# Patient Record
Sex: FEMALE | Race: WHITE
[De-identification: names, ages, dates, MRNs, and addresses within clinical notes are randomized per-mention and may not be internally consistent; named-entity substitution may affect disease eponyms.]

---

## 2018-08-10 ENCOUNTER — HOSPITAL ENCOUNTER (INPATIENT)
Dept: HOSPITAL 74 - JLDR | Age: 33
LOS: 2 days | Discharge: HOME | DRG: 560 | End: 2018-08-12
Attending: OBSTETRICS & GYNECOLOGY | Admitting: OBSTETRICS & GYNECOLOGY
Payer: COMMERCIAL

## 2018-08-10 VITALS — BODY MASS INDEX: 30.4 KG/M2

## 2018-08-10 DIAGNOSIS — Z3A.39: ICD-10-CM

## 2018-08-10 LAB
ANION GAP SERPL CALC-SCNC: 12 MMOL/L (ref 8–16)
APTT BLD: 25 SECONDS (ref 25.2–36.5)
BASOPHILS # BLD: 0.2 % (ref 0–2)
BUN SERPL-MCNC: 9 MG/DL (ref 7–18)
CALCIUM SERPL-MCNC: 8.6 MG/DL (ref 8.5–10.1)
CHLORIDE SERPL-SCNC: 113 MMOL/L (ref 98–107)
CO2 SERPL-SCNC: 18 MMOL/L (ref 21–32)
CREAT SERPL-MCNC: 0.5 MG/DL (ref 0.55–1.02)
DEPRECATED RDW RBC AUTO: 15.2 % (ref 11.6–15.6)
EOSINOPHIL # BLD: 0.3 % (ref 0–4.5)
GLUCOSE SERPL-MCNC: 110 MG/DL (ref 74–106)
HCT VFR BLD CALC: 34.1 % (ref 32.4–45.2)
HGB BLD-MCNC: 11.4 GM/DL (ref 10.7–15.3)
INR BLD: 1.03 (ref 0.83–1.09)
LYMPHOCYTES # BLD: 9.9 % (ref 8–40)
MCH RBC QN AUTO: 29.9 PG (ref 25.7–33.7)
MCHC RBC AUTO-ENTMCNC: 33.5 G/DL (ref 32–36)
MCV RBC: 89.1 FL (ref 80–96)
MONOCYTES # BLD AUTO: 5.8 % (ref 3.8–10.2)
NEUTROPHILS # BLD: 83.8 % (ref 42.8–82.8)
PLATELET # BLD AUTO: 240 K/MM3 (ref 134–434)
PMV BLD: 8.1 FL (ref 7.5–11.1)
POTASSIUM SERPLBLD-SCNC: 3.6 MMOL/L (ref 3.5–5.1)
PT PNL PPP: 11.6 SEC (ref 9.7–13)
RBC # BLD AUTO: 3.83 M/MM3 (ref 3.6–5.2)
SODIUM SERPL-SCNC: 143 MMOL/L (ref 136–145)
WBC # BLD AUTO: 12.5 K/MM3 (ref 4–10)

## 2018-08-10 PROCEDURE — 0KQM0ZZ REPAIR PERINEUM MUSCLE, OPEN APPROACH: ICD-10-PCS | Performed by: OBSTETRICS & GYNECOLOGY

## 2018-08-10 RX ADMIN — IBUPROFEN PRN MG: 600 TABLET, FILM COATED ORAL at 19:15

## 2018-08-10 RX ADMIN — FERROUS SULFATE TAB EC 324 MG (65 MG FE EQUIVALENT) SCH MG: 324 (65 FE) TABLET DELAYED RESPONSE at 22:13

## 2018-08-10 RX ADMIN — ACETAMINOPHEN PRN MG: 325 TABLET ORAL at 19:15

## 2018-08-10 NOTE — PN
Delivery





- Delivery


Vaginal Delivery: Spontaneous


Type of Anesthesia: Local


Episiotomy/Laceration: 2nd degree


EBL (cc): 300





Delivery, Single Birth





- Stages of Labor


Date 1st Stage Initiatied: 08/10/18


Time 1st Stage Initiated: 15:50


Date 2nd Stage Initiated: 08/10/18


Time 2nd Stage Initiated: 17:00


Date of Delivery: 08/10/18


Time of Delivery: 17:08


Time Placenta Delivered: 17:15





- Condition of Infant


Pediatrician/Neonatologist Present: No


Name: Christelle Diamond


Infant Gender: Female


Birth Weight: 6 lb 11 oz


Position: Right, OA


Total Hours ROM (Hrs/Mins): 7mins





- Apgar


  ** 1 Minute


Apgar Total Score: 9





  ** 5 Minutes


Apgar Total Score: 9





- [place] Feeding Plan


Initial Plan: Exclusive breastfeeding throughout hospitalization





Remarks





- Remarks


Remarks: 





Normal spontaneous vaginal delivery of a live infant over second degree 

laceration.


Nose / Oropharynx suctioned @ perineum.


Cord clamped and cut.


Placenta expelled spontaneously intact.


Laceration repaired with 2.0 chromic.

## 2018-08-10 NOTE — HP
Past Medical History





- Admission


Chief Complaint: Labor pain


History of Present Illness: 





32 yo  @ 39 weeks gestation, EDC 18 admitted for labor pain.


Upon admission she was fully dilated.


History Source: Patient


Limitations to Obtaining History: No Limitations





- Past Medical History


...: 2


...Para: 1


...Term: 1


...: 0


...Spon : 0


...Induced : 0


...Multiple Gestation: 0


...LMP: 17


... Weeks Gestation by Dates: 39.1


...EDC by Dates: 18


...EDC by Sono: 18





- Past Surgical History


Past Surgical History: Yes: None


Hx Myomectomy: No


Hx Transabdominal Cerclage: No





- Smoking History


Smoking history: Never smoked


Have you smoked in the past 12 months: No





- Alcohol/Substance Use


Hx Alcohol Use: No


History of Substance Use: reports: None





- Social History


Usual Living Arrangement: Yes: With Significant Other


History of Recent Travel: No





Home Medications





- Allergies


Allergies/Adverse Reactions: 


 Allergies











Allergy/AdvReac Type Severity Reaction Status Date / Time


 


No Known Allergies Allergy   Verified 08/10/18 17:36














- Home Medications


Home Medications: 


Ambulatory Orders





Prenatal Vit 108/Iron/Folic AC [Prenatal One Tablet] 1 each PO DAILY 08/10/18 











Family Disease History





- Family Disease History


Family History: Unremarkable





Review of Systems





- Review of Systems


Constitutional: reports: No Symptoms


Eyes: reports: No Symptoms


HENT: reports: No Symptoms


Neck: reports: No Symptoms


Cardiovascular: reports: No Symptoms


Respiratory: reports: No Symptoms


Gastrointestinal: reports: No Symptoms


Genitourinary: reports: Pain


Breasts: reports: No Symptoms Reported


Musculoskeletal: reports: No Symptoms


Integumentary: reports: No Symptoms


Neurological: reports: No Symptoms


Endocrine: reports: No Symptoms


Hematology/Lymphatic: reports: No Symptoms


Psychiatric: reports: No Symptoms


Pain Intensity: 8





Physical Exam - Maternity


Vital Signs: 


 Vital Signs











Temperature  98.2 F   08/10/18 17:00


 


Pulse Rate  87   08/10/18 17:00


 


Respiratory Rate  18   08/10/18 17:00


 


Blood Pressure  149/71   08/10/18 17:00


 


O2 Sat by Pulse Oximetry (%)      











Constitutional: Yes: Well Nourished


Eyes: Yes: Conjunctiva Clear


HENT: Yes: Atraumatic


Neck: Yes: Supple


Cardiovascular: Yes: Regular Rate and Rhythm


Lungs: Clear to auscultation


Breast(s): Yes: WNL





- Abdominal Exam/OB


Number of Fetuses: Single


Fetal Presentation: Vertex


Contractions: Yes





- Vaginal Exam/OB


Fetal Station: 0





- Physical Exam


...Motor Strength: WNL


Psychiatric: Yes: Alert, Oriented





Problem List





- Problems


(1) Pain during labor


Code(s): O99.89 - OTH DISEASES AND CONDITIONS COMPL PREG/CHLDBRTH; R52 - PAIN, 

UNSPECIFIED   





Assessment/Plan





Active labor


Admit to L&D


Anticipate

## 2018-08-11 LAB
BASOPHILS # BLD: 0.4 % (ref 0–2)
DEPRECATED RDW RBC AUTO: 14.8 % (ref 11.6–15.6)
EOSINOPHIL # BLD: 0.5 % (ref 0–4.5)
HCT VFR BLD CALC: 33.8 % (ref 32.4–45.2)
HGB BLD-MCNC: 11.4 GM/DL (ref 10.7–15.3)
LYMPHOCYTES # BLD: 13.5 % (ref 8–40)
MCH RBC QN AUTO: 30.1 PG (ref 25.7–33.7)
MCHC RBC AUTO-ENTMCNC: 33.7 G/DL (ref 32–36)
MCV RBC: 89.1 FL (ref 80–96)
MONOCYTES # BLD AUTO: 4.9 % (ref 3.8–10.2)
NEUTROPHILS # BLD: 80.7 % (ref 42.8–82.8)
PLATELET # BLD AUTO: 228 K/MM3 (ref 134–434)
PMV BLD: 8 FL (ref 7.5–11.1)
RBC # BLD AUTO: 3.79 M/MM3 (ref 3.6–5.2)
WBC # BLD AUTO: 14.9 K/MM3 (ref 4–10)

## 2018-08-11 RX ADMIN — FERROUS SULFATE TAB EC 324 MG (65 MG FE EQUIVALENT) SCH MG: 324 (65 FE) TABLET DELAYED RESPONSE at 09:02

## 2018-08-11 RX ADMIN — ACETAMINOPHEN PRN MG: 325 TABLET ORAL at 21:43

## 2018-08-11 RX ADMIN — IBUPROFEN PRN MG: 600 TABLET, FILM COATED ORAL at 16:53

## 2018-08-11 RX ADMIN — ACETAMINOPHEN PRN MG: 325 TABLET ORAL at 16:54

## 2018-08-11 RX ADMIN — ACETAMINOPHEN PRN MG: 325 TABLET ORAL at 09:09

## 2018-08-11 RX ADMIN — IBUPROFEN PRN MG: 600 TABLET, FILM COATED ORAL at 21:45

## 2018-08-11 RX ADMIN — FERROUS SULFATE TAB EC 324 MG (65 MG FE EQUIVALENT) SCH MG: 324 (65 FE) TABLET DELAYED RESPONSE at 21:43

## 2018-08-11 RX ADMIN — IBUPROFEN PRN MG: 600 TABLET, FILM COATED ORAL at 09:08

## 2018-08-11 RX ADMIN — Medication SCH TAB: at 09:02

## 2018-08-11 NOTE — PN
Post Partum Progress Note





- Subjective


Subjective: 





32 yo Para 2 status post vaginal delivery, seen and evaluated.


Doing well.





Post Partum Day: 1


Type of Delivery: 


Vital Signs: 


 Vital Signs











Temperature  98.5 F   18 07:10


 


Pulse Rate  71   18 07:10


 


Respiratory Rate  20   18 07:10


 


Blood Pressure  119/77   18 07:10


 


O2 Sat by Pulse Oximetry (%)  99   08/10/18 18:43











Breast Exam: Yes: Soft


Uterus: Yes: Fundus Firm


Abdomen/GI: Yes: Abdomen soft, Tolerating PO


Lochia: Yes: Rubra


Lochia, amount: Moderate


Extremities: Yes: Calves non-tender


Perineum: Yes: Laceration (Healing)


Activity: Ambulating





- Labs


Labs: 


 CBC











WBC  14.9 K/mm3 (4.0-10.0)  H  18  06:00    


 


RBC  3.79 M/mm3 (3.60-5.2)   18  06:00    


 


Hgb  11.4 GM/dL (10.7-15.3)   18  06:00    


 


Hct  33.8 % (32.4-45.2)   18  06:00    


 


MCV  89.1 fl (80-96)   18  06:00    


 


MCH  30.1 pg (25.7-33.7)   18  06:00    


 


MCHC  33.7 g/dl (32.0-36.0)   18  06:00    


 


RDW  14.8 % (11.6-15.6)   18  06:00    


 


Plt Count  228 K/MM3 (134-434)   18  06:00    


 


MPV  8.0 fl (7.5-11.1)   18  06:00    


 


Absolute Neuts (auto)  12.0 #  18  06:00    


 


Neutrophils %  80.7 % (42.8-82.8)   18  06:00    


 


Lymphocytes %  13.5 % (8-40)  D 18  06:00    


 


Monocytes %  4.9 % (3.8-10.2)   18  06:00    


 


Eosinophils %  0.5 % (0-4.5)   18  06:00    


 


Basophils %  0.4 % (0-2.0)   18  06:00    


 


Nucleated RBC %  0 % (0-0)   18  06:00    














Problem List





- Problems


(1) Pain during labor


Code(s): O99.89 - OTH DISEASES AND CONDITIONS COMPL PREG/CHLDBRTH; R52 - PAIN, 

UNSPECIFIED   








Assessment/Plan





Status post normal vaginal delivery


Stable


Continue routine postpartum care

## 2018-08-12 VITALS — DIASTOLIC BLOOD PRESSURE: 73 MMHG | SYSTOLIC BLOOD PRESSURE: 123 MMHG | HEART RATE: 71 BPM | TEMPERATURE: 98.1 F

## 2018-08-12 RX ADMIN — FERROUS SULFATE TAB EC 324 MG (65 MG FE EQUIVALENT) SCH MG: 324 (65 FE) TABLET DELAYED RESPONSE at 09:13

## 2018-08-12 RX ADMIN — Medication SCH TAB: at 09:13

## 2018-08-12 RX ADMIN — ACETAMINOPHEN PRN MG: 325 TABLET ORAL at 09:18

## 2018-08-12 RX ADMIN — IBUPROFEN PRN MG: 600 TABLET, FILM COATED ORAL at 09:18

## 2018-08-12 NOTE — DS
Physical Exam-GYN


Vital Signs: 


 Vital Signs











Temperature  98.1 F   18 07:20


 


Pulse Rate  71   18 07:20


 


Respiratory Rate  20   18 07:20


 


Blood Pressure  123/73   18 07:20


 


O2 Sat by Pulse Oximetry (%)  99   08/10/18 18:43











Constitutional: Yes: Well Nourished


Eyes: Yes: Conjunctiva Clear


HENT: Yes: Atraumatic


Neck: Yes: Supple


Cardiovascular: Yes: Regular Rate and Rhythm


Respiratory: Yes: Regular


Gastrointestinal: Yes: Normal Bowel Sounds


External Genitalia: Yes: Normal


Vaginal Exam: Yes: Normal


Cervix: Yes: Normal


Uterus: Yes: Firm


....Post Partum: Yes: Uterus firm, Moderate lochia serosa


Neurological: Yes: Alert, Oriented


...Motor Strength: WNL


Psychiatric: Yes: Alert, Oriented


Labs: 


 CBC, BMP





 18 06:00 





 08/10/18 18:00 











Delivery





- Delivery


Vaginal Delivery: Spontaneous


Type of Anesthesia: Local


Episiotomy/Laceration: 2nd degree


EBL (cc): 300





Delivery, Single Birth





- Stages of Labor


Date 1st Stage Initiatied: 08/10/18


Time 1st Stage Initiated: 15:50


Date 2nd Stage Initiated: 08/10/18


Time 2nd Stage Initiated: 17:00


Date of Delivery: 08/10/18


Time of Delivery: 17:08


Time Placenta Delivered: 17:15





- Condition of Infant


Pediatrician/Neonatologist Present: No


Name: Christelle Diamond


Infant Gender: Female


Birth Weight: 6 lb 11 oz


Position: Right, OA


Total Hours ROM (Hrs/Mins): 7mins





- Apgar


  ** 1 Minute


Apgar Total Score: 9





  ** 5 Minutes


Apgar Total Score: 9





-  Feeding Plan


Initial Plan: Exclusive breastfeeding throughout hospitalization





Discharge Summary


Reason For Visit: ADMIT LABOR


Current Active Problems





Pain during labor (Acute)


Status post normal delivery (Acute)








Procedures: Principal: Normal vaginal delivery


Hospital Course: 





Routine postpartum care


Condition: Good





- Instructions


Diet, Activity, Other Instructions: 


Regular diet


No douching, no sexual intercourse x 6 weeks


F/U in clinic in 6 weeks


Disposition: HOME





- Home Medications


Comprehensive Discharge Medication List: 


Ambulatory Orders





Prenatal Vit 108/Iron/Folic AC [Prenatal One Tablet] 1 each PO DAILY 08/10/18